# Patient Record
(demographics unavailable — no encounter records)

---

## 2025-03-18 NOTE — ASSESSMENT
[FreeTextEntry1] : 62 yo male with BPH/LUTS on tamsulosin 0.4 mg nightly, now with an increase in nocturia.  I discussed trying to avoid fluids before bedtime.  He can ask his PCP to switch him to a nonfluid treatment for cramps.  He can also consider coming off the diuretic, if possible.  Lastly, we discussed possibly adding a beta-3 agonist for frequency.  He will ask his PCP to come off fluids and/or spironolactone.  He will RTC in 1 year, or sooner as needed.

## 2025-03-18 NOTE — PHYSICAL EXAM
[General Appearance - Well Developed] : well developed [General Appearance - Well Nourished] : well nourished [Heart Rate And Rhythm] : heart rate and rhythm were normal [] : no respiratory distress [Abdomen Soft] : soft [Abdomen Tenderness] : non-tender [Costovertebral Angle Tenderness] : no ~M costovertebral angle tenderness [Urethral Meatus] : meatus normal [Penis Abnormality] : normal circumcised penis [Epididymis] : the epididymides were normal [Testes Tenderness] : no tenderness of the testes [Testes Mass (___cm)] : there were no testicular masses [Normal Station and Gait] : the gait and station were normal for the patient's age [Skin Color & Pigmentation] : normal skin color and pigmentation [No Focal Deficits] : no focal deficits [Oriented To Time, Place, And Person] : oriented to person, place, and time [Not Anxious] : not anxious

## 2025-03-18 NOTE — HISTORY OF PRESENT ILLNESS
[FreeTextEntry1] : 6/3/21: 59 yo male with recent hx of left epididymo-orchitis returns for follow up after completing 17 day course of abx.  He is feeling much improved with resolution of most of his voiding symptoms however he still notes nocturia and daytime frequency.  His scrotal swelling has decreased in size and his pain has improved, although there is some residual tenderness.  He did not start taking the tamsulosin that was prescribed.    PVR = 206  7/1/21: PVR = 82 cc He has improved voiding on tamsulosin with decreased nocturia and an improved urinary stream.  IPSS = 6; QoL = 2.  His scrotal swelling has resolved.  He still has residual tenderness in the epididymis that is helped with NSAIDs.   On a separate note, he has noticed some itching in the scrotum and inner thigh.  This has become more bothersome in the last few days.     1/28/22: Patient returns for follow up.  He has done well since his epididymitis.  He has no pain or discomfort. He has been taking tamsulosin daily which has helped improve his urination.  His nocturia has resolved and his flow is better.   PVR = 2 cc  His PCP checked his PSA in early December.  His last few PSAs are listed below  PSA: 10/26/2017 = 1.3 3/2019 = 1.5 11/2020 = 1.68 12/1/21 = 3.69 10 day course of Cipro 1/25/22 = 3.55  ************* 4/1/22: Patient is voiding well on tamsulosin.  He is here for repeat PSA.  He does note some mild left scrotal discomfort which is intermittent in nature.  **************** 6/23/22: Patient continues to void well on tamsulosin.  It helps reduce his frequency and nocturia, and it improves his urine stream.  He returns for another PSA to closely monitor a rising PSA over the last few years.  He is otherwise doing well.   PSA (4/1/22) = 3.3; 13% free  PVR = 53 cc  **************** 4/17/23: Patient returns for follow up.  HIs PSA has down trended over the last 6-8 months.  In the last 2 months he has noticed intermittent left flank pain.  At its worst the pain is a 4-5/10.  He denies any associated nausea or vomiting.  He denies any hematuria or dysuria.   In addition, he notes intermittent mild left scrotal ache.   PSA (10/19/22) = 2.59 PVR = 14 cc (LUTS)  ********************* 8/14/23 Patient returns for follow up. He continues to take tamsulosin 0.4 mg with good response. Mild LUTS with nocturia x2. He continues to have intermittent left flank pain 2/10 which was determined not to be of urologic origin based on normal renal US . His left scrotal discomfort has resolved. He is also here for a repeat PSA re hx of elevated PSA.   IPSS 6 QOL 2 PVR 41 cc (LUTS)  *************** 3/15/24: Patient feeling well.  No new issues.  Voiding well on tamsulosin.  Had PSA checked in the fall with PCP which was around 2.    ************** 3/18/25: Patient returns for follow up.  He needs refills on tamsulosin.  he has noticed an increase in nocturia over the last few months. He does note that he has been drinking tonic water on the advice of his PCP for leg cramps.  He finds that this causes him to have urinary frequency.  He has also been on spironolactone since the Spring.  His PSA from December 2024 was normal.   PVR (12/11/24) = 2.03